# Patient Record
Sex: MALE | Race: WHITE | NOT HISPANIC OR LATINO | Employment: UNEMPLOYED | URBAN - METROPOLITAN AREA
[De-identification: names, ages, dates, MRNs, and addresses within clinical notes are randomized per-mention and may not be internally consistent; named-entity substitution may affect disease eponyms.]

---

## 2019-04-24 ENCOUNTER — HOSPITAL ENCOUNTER (EMERGENCY)
Facility: MEDICAL CENTER | Age: 49
End: 2019-04-24
Attending: EMERGENCY MEDICINE
Payer: COMMERCIAL

## 2019-04-24 VITALS
TEMPERATURE: 99.4 F | SYSTOLIC BLOOD PRESSURE: 139 MMHG | DIASTOLIC BLOOD PRESSURE: 91 MMHG | RESPIRATION RATE: 16 BRPM | WEIGHT: 147.71 LBS | OXYGEN SATURATION: 98 % | HEART RATE: 101 BPM

## 2019-04-24 DIAGNOSIS — G47.00 INSOMNIA, UNSPECIFIED TYPE: ICD-10-CM

## 2019-04-24 DIAGNOSIS — Z59.00 HOMELESSNESS: ICD-10-CM

## 2019-04-24 DIAGNOSIS — R44.0 AUDITORY HALLUCINATIONS: ICD-10-CM

## 2019-04-24 LAB — POC BREATHALIZER: 0.01 PERCENT (ref 0–0.01)

## 2019-04-24 PROCEDURE — A9270 NON-COVERED ITEM OR SERVICE: HCPCS | Performed by: EMERGENCY MEDICINE

## 2019-04-24 PROCEDURE — 302970 POC BREATHALIZER: Performed by: EMERGENCY MEDICINE

## 2019-04-24 PROCEDURE — 99284 EMERGENCY DEPT VISIT MOD MDM: CPT

## 2019-04-24 PROCEDURE — 90791 PSYCH DIAGNOSTIC EVALUATION: CPT

## 2019-04-24 PROCEDURE — 700102 HCHG RX REV CODE 250 W/ 637 OVERRIDE(OP): Performed by: EMERGENCY MEDICINE

## 2019-04-24 RX ORDER — HYDROXYZINE PAMOATE 50 MG/1
50 CAPSULE ORAL
Qty: 3 CAP | Refills: 0 | Status: SHIPPED | OUTPATIENT
Start: 2019-04-24 | End: 2019-04-27

## 2019-04-24 RX ORDER — HYDROXYZINE PAMOATE 50 MG/1
50 CAPSULE ORAL ONCE
Status: COMPLETED | OUTPATIENT
Start: 2019-04-24 | End: 2019-04-24

## 2019-04-24 RX ORDER — RISPERIDONE 1 MG/1
1 TABLET ORAL ONCE
Status: COMPLETED | OUTPATIENT
Start: 2019-04-24 | End: 2019-04-24

## 2019-04-24 RX ORDER — RISPERIDONE 1 MG/1
1 TABLET ORAL DAILY
Qty: 2 TAB | Refills: 0 | Status: SHIPPED | OUTPATIENT
Start: 2019-04-24 | End: 2019-04-26

## 2019-04-24 RX ADMIN — RISPERIDONE 1 MG: 1 TABLET, FILM COATED ORAL at 18:30

## 2019-04-24 RX ADMIN — HYDROXYZINE PAMOATE 50 MG: 50 CAPSULE ORAL at 18:30

## 2019-04-24 SDOH — ECONOMIC STABILITY - HOUSING INSECURITY: HOMELESSNESS UNSPECIFIED: Z59.00

## 2019-04-25 NOTE — ED TRIAGE NOTES
Chief Complaint   Patient presents with   • Paranoid     Pt reports to be from traveling, on the road. From [place] and stranded in [place]. pt reports hearing voices about 2 people having a conversation and them saying that they are going to kill him for the past 2 days. pt denies drug use. explains to have been up for 2-3 days trying to find a way home. pt denies psych hx.    • Hallucinations     pt reports to be hearing voices   • Dehydration     Explained to pt triage process, made pt aware to tell this RN/staff of any changes/concerns, pt verbalized understanding of process and instructions given. Pt to [facility].

## 2019-04-25 NOTE — ED NOTES
Unable to scan, one scanner being utilized, someone came to check scanner but still not fixed, signed off with iggy lockwood

## 2019-04-25 NOTE — ED NOTES
Pt ambulatory with steady gait, pt verbalized understanding of poc and discharge , no questions ,  VSS and pt in nad at this time  Pt denies SI/HI at this time

## 2019-04-25 NOTE — ED PROVIDER NOTES
ED Provider Note    Scribed for Marybel Garnett M.D. by Marissa Luara. 4/24/2019  5:38 PM    Primary care provider: None noted  Means of arrival: Patient  History obtained from: Patient  History limited by: None     CHIEF COMPLAINT  Chief Complaint   Patient presents with   • Paranoid     Pt reports to be from traveling, on the road. From [place] and stranded in [place]. pt reports hearing voices about 2 people having a conversation and them saying that they are going to kill him for the past 2 days. pt denies drug use. explains to have been up for 2-3 days trying to find a way home. pt denies psych hx.    • Hallucinations     pt reports to be hearing voices   • Dehydration     HPI  Gunner Leiva is a 48 y.o. male who presents to the Emergency Department for evaluation of auditory and visual hallucinations onset 1 day ago. Patient states he has been traveling on foot from [place] and started experiencing 2 people having a conversation as well as some people following him around. He states he is concerned for his safety. Patient denies any tremors, SI, or HI. He denies previous history of hallucinations. Patient denies drug use. He reports drinking 2 beers. Denies past medical history.     REVIEW OF SYSTEMS  Neuro: no tremors   Psychiatric: auditory and visual hallucinations, No SI or HI.     See history of present illness.     PAST MEDICAL HISTORY  None noted     SURGICAL HISTORY  patient denies any surgical history    SOCIAL HISTORY  Social History   Substance Use Topics   • Smoking status: Current Every Day Smoker   • Smokeless tobacco: Never Used   • Alcohol use No      History   Drug Use No       FAMILY HISTORY  History reviewed. No pertinent family history.    CURRENT MEDICATIONS  Current Outpatient Prescriptions:   •  risperiDONE, 1 mg, Oral, DAILY  •  hydrOXYzine pamoate, 50 mg, Oral, QHS    ALLERGIES  No Known Allergies    PHYSICAL EXAM  VITAL SIGNS: /95   Pulse (!) 106   Temp 37.4 °C (99.4 °F)  (Temporal)   Resp 18   Wt 67 kg (147 lb 11.3 oz)   SpO2 95%     Constitutional: Well developed, Well nourished, No acute distress, Non-toxic appearance.   HEENT: Normocephalic, Atraumatic,  external ears normal, pharynx pink,  Mucous  Membranes moist, No rhinorrhea or mucosal edema  Eyes: PERRL, EOMI, Conjunctiva normal, No discharge.   Neck: Normal range of motion, No tenderness, Supple, No stridor.   Cardiovascular: Regular Rate and Rhythm, No murmurs,  rubs, or gallops.   Thorax & Lungs: Lungs clear to auscultation bilaterally, No respiratory distress, No wheezes, rhales or rhonchi, No chest wall tenderness.   Abdomen: Bowel sounds normal, Soft, non tender, non distended,  No pulsatile masses., no rebound guarding or peritoneal signs.   Skin: Warm, Dry, No erythema, No rash,   Extremities: Equal, intact distal pulses, No cyanosis, No tenderness.   Musculoskeletal: Good range of motion in all major joints. No tenderness to palpation or major deformities noted.   Neurologic: Alert & awake, no focal deficits  Psychiatric: Admits to auditory and visual hallucinations, Paranoia, No SI or HI.     DIAGNOSTIC STUDIES / PROCEDURES    LABS  Results for orders placed or performed during the hospital encounter of 04/24/19   POC BREATHALIZER   Result Value Ref Range    POC Breathalizer 0.01 0.00 - 0.01 Percent   All labs reviewed by me.    COURSE & MEDICAL DECISION MAKING  Nursing notes, VS, PMSFHx reviewed in chart.    5:38 PM Patient seen and examined at bedside. Ordered Urine drug screen and POC breathalizer to evaluate his symptoms. Patient will be evaluated by [facility].     6:32 PM Patient was evaluated [facility]. He is not a candidate for legal hold per [facility] as patient does not demonstrate SI or HI. Plan is to treat patient with 1 mg Risperdal and 50 mg Vistaril. He will be discharged to a shelter and will be provided with resources for bus pass back to [place]. Patient was informed of this plan which he  agrees with. The patient will return for new or worsening symptoms and is stable at the time of discharge.    HTN/IDDM FOLLOW UP:  The patient is referred to a primary physician for blood pressure management, diabetic screening, and for all other preventive health concerns    DISPOSITION:  Patient will be discharged home in stable condition.    FOLLOW UP:  No follow-up provider specified.    OUTPATIENT MEDICATIONS:  Discharge Medication List as of 4/24/2019  6:28 PM      START taking these medications    Details   risperiDONE (RISPERDAL) 1 MG Tab Take 1 Tab by mouth every day for 2 days., Disp-2 Tab, R-0, Print Rx Paper      hydrOXYzine pamoate (VISTARIL) 50 MG Cap Take 1 Cap by mouth every bedtime for 3 days., Disp-3 Cap, R-0, Print Rx Paper           FINAL IMPRESSION  1. Insomnia, unspecified type    2. Auditory hallucinations    3. Homelessness         Marissa MAYES ([place]), am scribing for, and in the presence of, Marybel Garnett M.D..  Electronically signed by: Marissa Laura ([place]), 4/24/2019  Marybel MAYES M.D. personally performed the services described in this documentation, as scribed by Marissa Laura in my presence, and it is both accurate and complete. E.     The note accurately reflects work and decisions made by me.  Marybel Garnett  4/24/2019  11:07 PM

## 2019-05-04 ENCOUNTER — HOSPITAL ENCOUNTER (EMERGENCY)
Facility: MEDICAL CENTER | Age: 49
End: 2019-05-04
Attending: EMERGENCY MEDICINE
Payer: COMMERCIAL

## 2019-05-04 VITALS
BODY MASS INDEX: 24.25 KG/M2 | RESPIRATION RATE: 20 BRPM | HEART RATE: 94 BPM | DIASTOLIC BLOOD PRESSURE: 76 MMHG | TEMPERATURE: 97.3 F | OXYGEN SATURATION: 97 % | HEIGHT: 68 IN | WEIGHT: 160 LBS | SYSTOLIC BLOOD PRESSURE: 108 MMHG

## 2019-05-04 DIAGNOSIS — F15.10 METHAMPHETAMINE ABUSE (HCC): ICD-10-CM

## 2019-05-04 DIAGNOSIS — F23 ACUTE PSYCHOSIS (HCC): ICD-10-CM

## 2019-05-04 LAB
ALBUMIN SERPL BCP-MCNC: 4.7 G/DL (ref 3.2–4.9)
ALBUMIN/GLOB SERPL: 1.7 G/DL
ALP SERPL-CCNC: 78 U/L (ref 30–99)
ALT SERPL-CCNC: 21 U/L (ref 2–50)
ANION GAP SERPL CALC-SCNC: 14 MMOL/L (ref 0–11.9)
AST SERPL-CCNC: 32 U/L (ref 12–45)
BASOPHILS # BLD AUTO: 0.5 % (ref 0–1.8)
BASOPHILS # BLD: 0.05 K/UL (ref 0–0.12)
BILIRUB SERPL-MCNC: 1.2 MG/DL (ref 0.1–1.5)
BUN SERPL-MCNC: 21 MG/DL (ref 8–22)
CALCIUM SERPL-MCNC: 9.7 MG/DL (ref 8.5–10.5)
CHLORIDE SERPL-SCNC: 101 MMOL/L (ref 96–112)
CO2 SERPL-SCNC: 21 MMOL/L (ref 20–33)
CREAT SERPL-MCNC: 1.14 MG/DL (ref 0.5–1.4)
EOSINOPHIL # BLD AUTO: 0.02 K/UL (ref 0–0.51)
EOSINOPHIL NFR BLD: 0.2 % (ref 0–6.9)
ERYTHROCYTE [DISTWIDTH] IN BLOOD BY AUTOMATED COUNT: 44 FL (ref 35.9–50)
ETHANOL BLD-MCNC: 0 G/DL
GLOBULIN SER CALC-MCNC: 2.7 G/DL (ref 1.9–3.5)
GLUCOSE SERPL-MCNC: 83 MG/DL (ref 65–99)
HCT VFR BLD AUTO: 50.5 % (ref 42–52)
HGB BLD-MCNC: 17.3 G/DL (ref 14–18)
IMM GRANULOCYTES # BLD AUTO: 0.03 K/UL (ref 0–0.11)
IMM GRANULOCYTES NFR BLD AUTO: 0.3 % (ref 0–0.9)
LYMPHOCYTES # BLD AUTO: 2.15 K/UL (ref 1–4.8)
LYMPHOCYTES NFR BLD: 22.5 % (ref 22–41)
MCH RBC QN AUTO: 32.2 PG (ref 27–33)
MCHC RBC AUTO-ENTMCNC: 34.3 G/DL (ref 33.7–35.3)
MCV RBC AUTO: 94 FL (ref 81.4–97.8)
MONOCYTES # BLD AUTO: 1.02 K/UL (ref 0–0.85)
MONOCYTES NFR BLD AUTO: 10.7 % (ref 0–13.4)
NEUTROPHILS # BLD AUTO: 6.29 K/UL (ref 1.82–7.42)
NEUTROPHILS NFR BLD: 65.8 % (ref 44–72)
NRBC # BLD AUTO: 0 K/UL
NRBC BLD-RTO: 0 /100 WBC
PLATELET # BLD AUTO: 278 K/UL (ref 164–446)
PMV BLD AUTO: 9.8 FL (ref 9–12.9)
POTASSIUM SERPL-SCNC: 4.3 MMOL/L (ref 3.6–5.5)
PROT SERPL-MCNC: 7.4 G/DL (ref 6–8.2)
RBC # BLD AUTO: 5.37 M/UL (ref 4.7–6.1)
SODIUM SERPL-SCNC: 136 MMOL/L (ref 135–145)
WBC # BLD AUTO: 9.6 K/UL (ref 4.8–10.8)

## 2019-05-04 PROCEDURE — 80053 COMPREHEN METABOLIC PANEL: CPT

## 2019-05-04 PROCEDURE — 90791 PSYCH DIAGNOSTIC EVALUATION: CPT

## 2019-05-04 PROCEDURE — 99285 EMERGENCY DEPT VISIT HI MDM: CPT

## 2019-05-04 PROCEDURE — 96372 THER/PROPH/DIAG INJ SC/IM: CPT

## 2019-05-04 PROCEDURE — 85025 COMPLETE CBC W/AUTO DIFF WBC: CPT

## 2019-05-04 PROCEDURE — 700111 HCHG RX REV CODE 636 W/ 250 OVERRIDE (IP): Performed by: EMERGENCY MEDICINE

## 2019-05-04 PROCEDURE — 80307 DRUG TEST PRSMV CHEM ANLYZR: CPT

## 2019-05-04 RX ORDER — HALOPERIDOL 5 MG/ML
5 INJECTION INTRAMUSCULAR ONCE
Status: COMPLETED | OUTPATIENT
Start: 2019-05-04 | End: 2019-05-04

## 2019-05-04 RX ORDER — DIPHENHYDRAMINE HYDROCHLORIDE 50 MG/ML
50 INJECTION INTRAMUSCULAR; INTRAVENOUS ONCE
Status: COMPLETED | OUTPATIENT
Start: 2019-05-04 | End: 2019-05-04

## 2019-05-04 RX ORDER — RISPERIDONE 1 MG/1
1 TABLET ORAL 2 TIMES DAILY
Qty: 60 TAB | Refills: 3 | Status: SHIPPED | OUTPATIENT
Start: 2019-05-04

## 2019-05-04 RX ADMIN — HALOPERIDOL LACTATE 5 MG: 5 INJECTION, SOLUTION INTRAMUSCULAR at 11:37

## 2019-05-04 RX ADMIN — DIPHENHYDRAMINE HYDROCHLORIDE 50 MG: 50 INJECTION INTRAMUSCULAR; INTRAVENOUS at 11:37

## 2019-05-04 ASSESSMENT — LIFESTYLE VARIABLES: DO YOU DRINK ALCOHOL: YES

## 2019-05-04 NOTE — ED NOTES
"Pt provided with charged cell phone(from triage charging station), states \" I've had 3 or 4 cell phones and I've lost every contact I knew. \"  "

## 2019-05-04 NOTE — ED PROVIDER NOTES
"ED Provider  Scribed for Kodi Lema D.O. by Alysa Thorne. 5/4/2019  10:12 AM    Means of arrival: [facility]  History obtained from: Patient  History limited by: None    CHIEF COMPLAINT  Chief Complaint   Patient presents with   • Psych Eval   • Off Psych Meds   • Drug Abuse       HPI  Gunner Leiva is a 48 y.o. male with a history of schizophrenia and bipolar disorder who presents for a psychological evaluation. The patient states that he thinks that \"someone is trying to kill him\". Additionally, he reports seeing \"guns pointed at him and thinks that someone is after him\". He has been having these visualizations for the last week and reports that he has been in and out of the hospital during that time as well. He has not been compliant with his psych medications. The patient states that he consumed alcohol earlier today and took methamphetamine about two days ago. The patient has been trying to make phone calls to make an appointment with the mental health services that he was referred to but has yet to be successful in seeing a provider. He is currently homeless; however, he is trying to get back home to [place].     REVIEW OF SYSTEMS  See HPI for further details. All other systems are negative.     PAST MEDICAL HISTORY       SOCIAL HISTORY  Social History     Social History Main Topics   • Smoking status: Current Every Day Smoker   • Smokeless tobacco: Never Used   • Alcohol use No   • Drug use: No   • Sexual activity: None noted       SURGICAL HISTORY  patient denies any surgical history    CURRENT MEDICATIONS  Home Medications    **Home medications have not yet been reviewed for this encounter**         ALLERGIES  No Known Allergies    PHYSICAL EXAM  VITAL SIGNS: /101   Pulse (!) 135   Temp 36.4 °C (97.5 °F)   Resp 18   Ht 1.727 m (5' 8\")   Wt 72.6 kg (160 lb)   SpO2 96%   BMI 24.33 kg/m²     Pulse ox interpretation: Pulse ox is normal.  Constitutional: Alert. Anxious.   HENT: No signs " of trauma, Bilateral external ears normal, Nose normal.   Eyes: Pupils are equal and reactive, Conjunctiva normal, Non-icteric.   Neck: Normal range of motion, No tenderness, Supple, No stridor.   Lymphatic: No lymphadenopathy noted.   Cardiovascular: Regular rate and rhythm, no murmurs.   Thorax & Lungs: Normal breath sounds, No respiratory distress, No wheezing, No chest tenderness.   Abdomen: Bowel sounds normal, Soft, No tenderness, No masses, No pulsatile masses. No peritoneal signs.  Skin: Warm, Dry, No erythema, No rash.   Back: No bony tenderness, No CVA tenderness.   Extremities: Intact distal pulses, No edema, No tenderness, No cyanosis,  Negative Carline's sign.   Musculoskeletal: Good range of motion in all major joints. No tenderness to palpation or major deformities noted.   Neurologic: Alert , Normal motor function, Normal sensory function, No focal deficits noted.   Psychiatric: Anxious. Paranoid. Patient believes that people are trying to kill him. Believes to see guns pointed at him.       MEDICAL DECISION MAKING  This is a 48 y.o. male who presents with psychosis, he does admit to using methamphetamine just 2 days ago however his current behavior suggest that he may have taken recently.  He was anxious, paranoid and delusional.  Haldol was given it did resolve his psychosis, and his anxiety.  He was allowed to rest for.  He is sleeping easily and easily aroused.  He is not suicidal or homicidal and does not recall where legal hold for placement.  He has family in [place] he is trying to get back to [place] but nobody will send him a plane to get back.  The patient I believe is suffering from methamphetamine abuse however he does have a psychiatric history which is being exacerbated by his methamphetamine use.  He will be sent home with Risperdal twice a day as a medication may help with his psychosis.  He is to follow-up with the [facility].    DIAGNOSTIC STUDIES /  PROCEDURES    LABS  Results for orders placed or performed during the hospital encounter of 05/04/19   CBC WITH DIFFERENTIAL   Result Value Ref Range    WBC 9.6 4.8 - 10.8 K/uL    RBC 5.37 4.70 - 6.10 M/uL    Hemoglobin 17.3 14.0 - 18.0 g/dL    Hematocrit 50.5 42.0 - 52.0 %    MCV 94.0 81.4 - 97.8 fL    MCH 32.2 27.0 - 33.0 pg    MCHC 34.3 33.7 - 35.3 g/dL    RDW 44.0 35.9 - 50.0 fL    Platelet Count 278 164 - 446 K/uL    MPV 9.8 9.0 - 12.9 fL    Neutrophils-Polys 65.80 44.00 - 72.00 %    Lymphocytes 22.50 22.00 - 41.00 %    Monocytes 10.70 0.00 - 13.40 %    Eosinophils 0.20 0.00 - 6.90 %    Basophils 0.50 0.00 - 1.80 %    Immature Granulocytes 0.30 0.00 - 0.90 %    Nucleated RBC 0.00 /100 WBC    Neutrophils (Absolute) 6.29 1.82 - 7.42 K/uL    Lymphs (Absolute) 2.15 1.00 - 4.80 K/uL    Monos (Absolute) 1.02 (H) 0.00 - 0.85 K/uL    Eos (Absolute) 0.02 0.00 - 0.51 K/uL    Baso (Absolute) 0.05 0.00 - 0.12 K/uL    Immature Granulocytes (abs) 0.03 0.00 - 0.11 K/uL    NRBC (Absolute) 0.00 K/uL   COMP METABOLIC PANEL   Result Value Ref Range    Sodium 136 135 - 145 mmol/L    Potassium 4.3 3.6 - 5.5 mmol/L    Chloride 101 96 - 112 mmol/L    Co2 21 20 - 33 mmol/L    Anion Gap 14.0 (H) 0.0 - 11.9    Glucose 83 65 - 99 mg/dL    Bun 21 8 - 22 mg/dL    Creatinine 1.14 0.50 - 1.40 mg/dL    Calcium 9.7 8.5 - 10.5 mg/dL    AST(SGOT) 32 12 - 45 U/L    ALT(SGPT) 21 2 - 50 U/L    Alkaline Phosphatase 78 30 - 99 U/L    Total Bilirubin 1.2 0.1 - 1.5 mg/dL    Albumin 4.7 3.2 - 4.9 g/dL    Total Protein 7.4 6.0 - 8.2 g/dL    Globulin 2.7 1.9 - 3.5 g/dL    A-G Ratio 1.7 g/dL   DIAGNOSTIC ALCOHOL   Result Value Ref Range    Diagnostic Alcohol 0.00 0.00 g/dL   ESTIMATED GFR   Result Value Ref Range    GFR If African American >60 >60 mL/min/1.73 m 2    GFR If Non African American >60 >60 mL/min/1.73 m 2       COURSE  Pertinent Labs & Imaging studies reviewed. (See chart for details)    10:12 AM - Patient seen and examined at bedside.  Discussed plan of care, including behavorial health consult and laboratory studies. Patient agrees to the plan of care. Ordered for urine drug screen, diagnostic alcohol, CBC, CMP to evaluate his symptoms.  Differential diagnoses include but not limited to: Methamphetamine abuse, psychosis, bipolar disorder.    10:33 AM - Ordered estimated GFR.     11:21 AM - Behavioral Health consulted the patient. Upon reevaluation, the patient appears to be more anxious and will be treated for psychosis. Ordered Haldol 5 mg, Benadryl 50 mg for symptoms.      12:47 PM - Patient was reevaluated at bedside. Discussed lab results with the patient as shown above. The patient is calm and cooperative at this time and will be evaluated by [facility].      1:12 PM - [facility] was consulted and they agreed to an evaluation.     2:01 PM - [facility] note that the patient is more somnolent. Patient states that he has been trying to get back home to [place] to be with his family, but has not been able to get a ticket back. [facility] was able to work out a way for him to get back to [place] in order to be cared for by his family.     3:46 PM - Patient was reevaluated at bedside. The patient is sleeping but wakes with arousal.           FINAL IMPRESSION  1. Methamphetamine abuse (HCC)    2. Acute psychosis (HCC)         Alysa MAYES (Scribe), am scribing for, and in the presence of, Kodi Lema D.O..    Electronically signed by: Alysa Thorne (Scribe), 5/4/2019    IKodi D.O. personally performed the services described in this documentation, as scribed by Alysa Thorne in my presence, and it is both accurate and complete. C    The note accurately reflects work and decisions made by me.  Kodi Lema  5/4/2019  5:41 PM

## 2019-05-04 NOTE — CONSULTS
"[facility]   TRIAGE ASSESSMENT    Name: Gunner Leiva  MRN: [id number]  : 1970  Age: 48 y.o.  Date of assessment: 2019  PCP: No primary care provider on file.  Persons in attendance: Patient    CHIEF COMPLAINT/PRESENTING ISSUE (as stated by ): [facility] as he was found [place] hearing voices stating \"someone is trying to kill me\". Denies S/I or H/I.  States he just needs to get these voices out of his head as he is pacing and patting his head.  Disorganized thought process, unable to answer questions. States he needs to get back to [place] but is unable to figure out how. States he knows people are out to kill him and he is really scared.  States he knows he has paranoid schizophrenia and needs to take medications.     Chief Complaint   Patient presents with   • Psych Eval   • Off Psych Meds   • Drug Abuse        CURRENT LIVING SITUATION/SOCIAL SUPPORT: homeless but reports that he lives in [place] and has been trying to get back there.      BEHAVIORAL HEALTH TREATMENT HISTORY  Does patient/parent report a history of prior behavioral health treatment for patient?   Yes:    Dates Level of Care Facilty/Provider Diagnosis/Problem Medications    [facility] [place] Schizophrenia Risperdal  Vistaril       SAFETY ASSESSMENT - SELF  Does patient acknowledge current or past symptoms of dangerousness to self? no  Does parent/significant other report patient has current or past symptoms of dangerousness to self? no  Does presenting problem suggest symptoms of dangerousness to self? No    SAFETY ASSESSMENT - OTHERS  Does patient acknowledge current or past symptoms of aggressive behavior or risk to others? no  Does parent/significant other report patient has current or past symptoms of aggressive behavior or risk to others?  no  Does presenting problem suggest symptoms of dangerousness to others? No    Crisis Safety Plan completed and copy given to patient? pending    ABUSE/NEGLECT " "SCREENING  Does patient report feeling “unsafe” in his/her home, or afraid of anyone?  Yes  Thinks people are trying to kill him due to his psychosis.  Does patient report any history of physical, sexual, or emotional abuse?  no  Does parent or significant other report any of the above? N\A  Is there evidence of neglect by self?  no  Is there evidence of neglect by a caregiver? no  Does the patient/parent report any history of CPS/APS/police involvement related to suspected abuse/neglect or domestic violence? no  Based on the information provided during the current assessment, is a mandated report of suspected abuse/neglect being made?  No    SUBSTANCE USE SCREENING  Yes:  Ashish all substances used in the past 30 days:      Last Use Amount   []   Alcohol     []   Marijuana     []   Heroin     []   Prescription Opioids  (used without prescription, for    recreation, or in excess of prescribed amount)     []   Other Prescription  (used without prescription, for    recreation, or in excess of prescribed amount)     []   Cocaine      []   Methamphetamine     []   \"\" drugs (ectasy, MDMA)     []   Other substances        UDS results: pending  Breathalyzer results: 0.01    What consequences does the patient associate with any of the above substance use and or addictive behaviors? Other: unknown    Risk factors for detox (check all that apply):  []  Seizures   []  Diaphoretic (sweating)   []  Tremors   []  Hallucinations   []  Increased blood pressure   []  Decreased blood pressure   []  Other   []  None      [] Patient education on risk factors for detoxification and instructed to return to ER as needed.      MENTAL STATUS   Participation: easily distracted.  Grooming: Disheveled  Orientation: Alert and Evidence of delusions present  Behavior: Agitated and Tense  Eye contact: Poor  Mood: Anxious  Affect: Sad and Anxious  Thought process: Loose associations  Thought content: Paranoia  Speech: Pressured  Perception: " Evidence of auditory hallucination  Memory:  Recent:  Limited  Insight: Adequate  Judgment:  Limited  Other:    Collateral information:   Source:  [] Significant other present in person:   [] Significant other by telephone  [] [facility]   [] [facility] Nursing Staff  [x] [facility]  [] Other:     [] Unable to complete full assessment due to:  [] Acute intoxication  [] Patient declined to participate/engage  [] Patient verbally unresponsive  [] Significant cognitive deficits  [] Significant perceptual distortions or behavioral disorganization  [] Other:      CLINICAL IMPRESSIONS:  Primary:  Schizophrenia A/H  Secondary:  anxiety       IDENTIFIED NEEDS/PLAN:  [Trigger DISPOSITION list for any items marked]    []  Imminent safety risk - self [] Imminent safety risk - others   []  Acute substance withdrawal [x]  Psychosis/Impaired reality testing   [x]  Mood/anxiety [x]  Substance use/Addictive behavior   []  Maladaptive behaviro []  Parent/child conflict   []  Family/Couples conflict []  Biomedical   [x]  Housing []  Financial   []   Legal  Occupational/Educational   []  Domestic violence []  Other:     Disposition: Refer to [facility], 12 Step program: [facility]    Does patient express agreement with the above plan? yes    Referral appointment(s) scheduled? yes    Alert team only:   I have discussed findings and recommendations with Dr. Barbosa who is in agreement with these recommendations.     Referral information sent to the following community providers :          Lisa Presley R.N.  5/4/2019

## 2019-05-04 NOTE — ED NOTES
"Pt provided with ED box lunch.  [place], states \" I just keep hearing these voices, they just won't go away. \"    "

## 2019-05-04 NOTE — ED TRIAGE NOTES
".  Chief Complaint   Patient presents with   • Psych Eval   • Off Psych Meds   • Drug Abuse   Pt [facility] from \" a gas station. \"  Pt reports hearing voices, states \" someone is going to kill me, I'm a dead man. \"  Pt has history of schizophrenia, \" I have not been taking my meds for a long time. \"  No SI/HI.  Pt also reports last meth use approximately 2 days ago. Last ETOH use?  FSBS 77.     "

## 2019-05-05 NOTE — DISCHARGE INSTRUCTIONS
Stop using methamphetamine, please follow-up with [facility] for further evaluation and treatment.

## 2019-05-05 NOTE — ED NOTES
All lines and monitors disconnected.  Discharge instructions reviewed, questions answered.  Pt to [facility], escorted by RN.  Pt provided with a bus pass and prescriptions X 1. Pt states all belongings in possession.